# Patient Record
Sex: MALE | Race: WHITE | NOT HISPANIC OR LATINO | Employment: FULL TIME | URBAN - NONMETROPOLITAN AREA
[De-identification: names, ages, dates, MRNs, and addresses within clinical notes are randomized per-mention and may not be internally consistent; named-entity substitution may affect disease eponyms.]

---

## 2024-09-19 ENCOUNTER — HOSPITAL ENCOUNTER (OUTPATIENT)
Dept: RADIOLOGY | Facility: CLINIC | Age: 24
Discharge: HOME | End: 2024-09-19
Payer: COMMERCIAL

## 2024-09-19 ENCOUNTER — OFFICE VISIT (OUTPATIENT)
Dept: PRIMARY CARE | Facility: CLINIC | Age: 24
End: 2024-09-19
Payer: COMMERCIAL

## 2024-09-19 VITALS
HEART RATE: 63 BPM | BODY MASS INDEX: 20.33 KG/M2 | DIASTOLIC BLOOD PRESSURE: 80 MMHG | SYSTOLIC BLOOD PRESSURE: 137 MMHG | WEIGHT: 129.8 LBS | OXYGEN SATURATION: 100 %

## 2024-09-19 DIAGNOSIS — R07.81 RIB PAIN ON LEFT SIDE: ICD-10-CM

## 2024-09-19 DIAGNOSIS — R07.81 RIB PAIN ON LEFT SIDE: Primary | ICD-10-CM

## 2024-09-19 PROBLEM — M25.512 CHRONIC LEFT SHOULDER PAIN: Status: RESOLVED | Noted: 2024-09-19 | Resolved: 2024-09-19

## 2024-09-19 PROBLEM — H72.91 ACUTE OTITIS MEDIA OF RIGHT EAR WITH PERFORATION: Status: RESOLVED | Noted: 2024-09-19 | Resolved: 2024-09-19

## 2024-09-19 PROBLEM — G89.29 CHRONIC RIGHT SHOULDER PAIN: Status: RESOLVED | Noted: 2024-09-19 | Resolved: 2024-09-19

## 2024-09-19 PROBLEM — H66.91 ACUTE OTITIS MEDIA OF RIGHT EAR WITH PERFORATION: Status: RESOLVED | Noted: 2024-09-19 | Resolved: 2024-09-19

## 2024-09-19 PROBLEM — F32.1 DEPRESSION, MAJOR, SINGLE EPISODE, MODERATE (MULTI): Status: RESOLVED | Noted: 2024-09-19 | Resolved: 2024-09-19

## 2024-09-19 PROBLEM — K52.9 CHRONIC DIARRHEA: Status: RESOLVED | Noted: 2024-09-19 | Resolved: 2024-09-19

## 2024-09-19 PROBLEM — F51.01 PRIMARY INSOMNIA: Status: RESOLVED | Noted: 2024-09-19 | Resolved: 2024-09-19

## 2024-09-19 PROBLEM — G89.29 CHRONIC LEFT SHOULDER PAIN: Status: RESOLVED | Noted: 2024-09-19 | Resolved: 2024-09-19

## 2024-09-19 PROBLEM — L72.3 SEBACEOUS CYST: Status: RESOLVED | Noted: 2024-09-19 | Resolved: 2024-09-19

## 2024-09-19 PROBLEM — M25.511 CHRONIC RIGHT SHOULDER PAIN: Status: RESOLVED | Noted: 2024-09-19 | Resolved: 2024-09-19

## 2024-09-19 PROCEDURE — 4004F PT TOBACCO SCREEN RCVD TLK: CPT | Performed by: FAMILY MEDICINE

## 2024-09-19 PROCEDURE — 99213 OFFICE O/P EST LOW 20 MIN: CPT | Performed by: FAMILY MEDICINE

## 2024-09-19 PROCEDURE — 71101 X-RAY EXAM UNILAT RIBS/CHEST: CPT | Mod: LEFT SIDE | Performed by: RADIOLOGY

## 2024-09-19 PROCEDURE — 71101 X-RAY EXAM UNILAT RIBS/CHEST: CPT | Mod: LT

## 2024-09-19 ASSESSMENT — PATIENT HEALTH QUESTIONNAIRE - PHQ9
1. LITTLE INTEREST OR PLEASURE IN DOING THINGS: NOT AT ALL
SUM OF ALL RESPONSES TO PHQ9 QUESTIONS 1 AND 2: 0
2. FEELING DOWN, DEPRESSED OR HOPELESS: NOT AT ALL

## 2024-09-19 NOTE — PROGRESS NOTES
Subjective   Patient ID: Dante Wild is a 24 y.o. male who presents for Fall (Fell a couple feet side went into tail gate heard and feel a pop almost passed out ).  HPI  About 1 hour ago fell off the bed of a truck and left side slammed into tailgate  [place] and heard things pop  Saw stars  Entire left side of chest hurts  Sharp where hit and rest achy  Worse- breathing, getting up, using left arm  Better- bot moving  No SOB now  No cough, wheezing  No n/v    No current outpatient medications on file.   Past Surgical History:   Procedure Laterality Date    TONSILLECTOMY  06/06/2014    Tonsillectomy With Adenoidectomy    TYMPANOSTOMY TUBE PLACEMENT  06/06/2014    Ear Pressure Equalization Tube, Insertion, Bilaterally      Past Medical History:   Diagnosis Date    Acute maxillary sinusitis, unspecified 03/16/2018    Acute maxillary sinusitis    Acute otitis media of right ear with perforation 09/19/2024    Chronic diarrhea 09/19/2024    Chronic left shoulder pain 09/19/2024    Chronic right shoulder pain 09/19/2024    Depression, major, single episode, moderate (Multi) 09/19/2024    Personal history of diseases of the skin and subcutaneous tissue 07/28/2015    History of contact dermatitis    Primary insomnia 09/19/2024    Rib pain on left side 09/19/2024    Sebaceous cyst 09/19/2024     Social History     Tobacco Use    Smoking status: Some Days     Types: Cigarettes    Smokeless tobacco: Never   Vaping Use    Vaping status: Every Day      No family history on file.   Review of Systems    Objective   /80   Pulse 63   Wt 58.9 kg (129 lb 12.8 oz)   SpO2 100%   BMI 20.33 kg/m²    Physical Exam  Vitals and nursing note reviewed.   Constitutional:       Appearance: Normal appearance.   Cardiovascular:      Rate and Rhythm: Normal rate and regular rhythm.      Pulses: Normal pulses.      Heart sounds: Normal heart sounds.   Pulmonary:      Effort: Pulmonary effort is normal.      Breath sounds: Normal breath  sounds. No wheezing, rhonchi or rales.      Comments: TTP over lateral portion of mid ribs on the left  No step-off  Some bruising and swelling  Chest:      Chest wall: Tenderness present.   Neurological:      Mental Status: He is alert.         Assessment/Plan   Problem List Items Addressed This Visit    None  Visit Diagnoses       Rib pain on left side    -  Primary    Relevant Orders    XR ribs 2 views left w chest pa or ap        Heat 20 minutes tid  Tylenol/ibuprofen for pain  Did not want anything stronger  Told will take 6-8 weeks to heal  Call if increasing SOB  No binding    Patient understands and agrees with treatment plan    Marcos Carrizales, DO